# Patient Record
Sex: MALE | Race: WHITE | NOT HISPANIC OR LATINO | Employment: FULL TIME | URBAN - METROPOLITAN AREA
[De-identification: names, ages, dates, MRNs, and addresses within clinical notes are randomized per-mention and may not be internally consistent; named-entity substitution may affect disease eponyms.]

---

## 2017-02-14 ENCOUNTER — OFFICE VISIT (OUTPATIENT)
Dept: GASTROENTEROLOGY | Facility: CLINIC | Age: 62
End: 2017-02-14
Payer: COMMERCIAL

## 2017-02-14 VITALS
DIASTOLIC BLOOD PRESSURE: 80 MMHG | HEART RATE: 82 BPM | BODY MASS INDEX: 32.73 KG/M2 | WEIGHT: 228.63 LBS | HEIGHT: 70 IN | SYSTOLIC BLOOD PRESSURE: 126 MMHG

## 2017-02-14 DIAGNOSIS — Z86.010 HX OF COLONIC POLYPS: Primary | ICD-10-CM

## 2017-02-14 PROCEDURE — 99999 PR PBB SHADOW E&M-EST. PATIENT-LVL III: CPT | Mod: PBBFAC,,, | Performed by: INTERNAL MEDICINE

## 2017-02-14 PROCEDURE — 99203 OFFICE O/P NEW LOW 30 MIN: CPT | Mod: S$GLB,,, | Performed by: INTERNAL MEDICINE

## 2017-02-14 RX ORDER — SODIUM, POTASSIUM,MAG SULFATES 17.5-3.13G
SOLUTION, RECONSTITUTED, ORAL ORAL
Qty: 354 ML | Refills: 0 | Status: SHIPPED | OUTPATIENT
Start: 2017-02-14 | End: 2023-05-27

## 2017-02-14 NOTE — MR AVS SNAPSHOT
[facility]  [address]  [place] [address]  Phone: [phone]  Fax: [phone]                  Gavin Webster   2017 8:00 AM   Office Visit    Description:  Male : 1955   Provider:  Elvis Baker III, MD   Department:  [facility]           Reason for Visit     Colon Cancer Screening           Diagnoses this Visit        Comments    Hx of colonic polyps    -  Primary            To Do List           Goals (5 Years of Data)     None       These Medications        Disp Refills Start End    sodium,potassium,mag sulfates (SUPREP BOWEL PREP KIT) 17.5-3.13-1.6 gram SolR 354 mL 0 2017     As Directed    Pharmacy: [facility]/pharmacy #[id number] - [place] - [address] AT [facility] Ph #: [phone]         [facility] On Call     [facility] On Call Nurse Care Line -  Assistance  Registered nurses in the [facility] provide clinical advisement, health education, appointment booking, and other advisory services.  Call for this free service at [phone].             Medications           Message regarding Medications     Verify the changes and/or additions to your medication regime listed below are the same as discussed with your clinician today.  If any of these changes or additions are incorrect, please notify your healthcare provider.        START taking these NEW medications        Refills    sodium,potassium,mag sulfates (SUPREP BOWEL PREP KIT) 17.5-3.13-1.6 gram SolR 0    Sig: As Directed    Class: Normal           Verify that the below list of medications is an accurate representation of the medications you are currently taking.  If none reported, the list may be blank. If incorrect, please contact your healthcare provider. Carry this list with you in case of emergency.           Current Medications     doxazosin (CARDURA) 8 MG Tab TAKE 1 TABLET BY MOUTH AT BEDTIME    levothyroxine  "(SYNTHROID) 137 MCG Tab tablet Take 1 tablet (137 mcg total) by mouth once daily.    simvastatin (ZOCOR) 40 MG tablet Take 1 tablet (40 mg total) by mouth every evening.    meloxicam (MOBIC) 15 MG tablet Take 1 tablet (15 mg total) by mouth daily as needed for Pain.    sodium,potassium,mag sulfates (SUPREP BOWEL PREP KIT) 17.5-3.13-1.6 gram SolR As Directed           Clinical Reference Information           Your Vitals Were     BP Pulse Height Weight BMI    126/80 82 5' 10" (1.778 m) 103.7 kg (228 lb 9.9 oz) 32.8 kg/m2      Blood Pressure          Most Recent Value    BP  126/80      Allergies as of 2/14/2017     No Known Allergies      Immunizations Administered on Date of Encounter - 2/14/2017     None      Orders Placed During Today's Visit      Normal Orders This Visit    Case request GI: COLONOSCOPY       [facility] Sign-Up     Activating your [facility] account is as easy as 1-2-3!     1) Visit [email or web address], select Sign Up Now, enter this activation code and your date of birth, then select Next.  [email or web address]  Expires: 3/24/2017  2:12 PM      2) Create a username and password to use when you visit [facility] in the future and select a security question in case you lose your password and select Next.    3) Enter your e-mail address and click Sign Up!    Additional Information  If you have questions, please e-mail [email or web address] or call [phone] to talk to our [facility] staff. Remember, [facility] is NOT to be used for urgent needs. For medical emergencies, dial 911.         Language Assistance Services     ATTENTION: Language assistance services are available, free of charge. Please call [phone].      ATENCIÓN: Si habla patt, tiene a angeles disposición servicios gratuitos de asistencia lingüística. Llame al [phone].     CHÚ Ý: N?u b?n nói Ti?ng Vi?t, có các d?ch v? h? tr? ngôn ng? mi?n phí dành cho b?n. G?i s? [phone].         [facility] complies with applicable " Federal civil rights laws and does not discriminate on the basis of race, color, national origin, age, disability, or sex.

## 2017-02-14 NOTE — PROGRESS NOTES
Subjective:       Patient ID: Gavin Webster is a 61 y.o. male.    Chief Complaint: Colon Cancer Screening    HPI Comments: Patient's last colonoscopy in 2014, and a Large Descending colon polyp was removed. The lesion had high grade dysplasia so he should have been back in 1 year. He has not kept his appointment before but is here now for his repeat evaluation. He denies abdominal pain, BRBPR, anorexia or weight loss.     Review of Systems   Constitutional: Negative.  Negative for activity change, appetite change, chills, diaphoresis, fatigue, fever and unexpected weight change.   HENT: Negative for congestion, ear discharge, facial swelling, hearing loss, nosebleeds, postnasal drip, sinus pressure, sneezing, tinnitus, trouble swallowing and voice change.    Eyes: Negative for photophobia, redness and visual disturbance.   Respiratory: Negative for cough, chest tightness, shortness of breath and wheezing.    Cardiovascular: Negative for chest pain and palpitations.   Gastrointestinal: Negative for abdominal distention, abdominal pain, blood in stool, constipation, diarrhea, nausea, rectal pain and vomiting.   Genitourinary: Negative for difficulty urinating, discharge, dysuria, flank pain, frequency, hematuria, scrotal swelling, testicular pain and urgency.   Musculoskeletal: Negative for arthralgias, back pain, gait problem, joint swelling, myalgias and neck stiffness.   Skin: Negative for color change, pallor, rash and wound.   Neurological: Negative for dizziness, tremors, seizures, syncope, facial asymmetry, speech difficulty, weakness, light-headedness, numbness and headaches.   Hematological: Negative for adenopathy. Does not bruise/bleed easily.   Psychiatric/Behavioral: Negative for agitation, confusion, hallucinations, sleep disturbance and suicidal ideas.       Objective:      Physical Exam   Constitutional: He is oriented to person, place, and time. He appears well-developed and well-nourished. No  distress.   HENT:   Head: Normocephalic and atraumatic.   Nose: Nose normal.   Mouth/Throat: Oropharynx is clear and moist. No oropharyngeal exudate.   Eyes: Conjunctivae are normal. Pupils are equal, round, and reactive to light. No scleral icterus.   Neck: Normal range of motion. Neck supple. No thyromegaly present.   Carotid pulses not heard well.   Cardiovascular: Normal rate and regular rhythm.  Exam reveals no gallop and no friction rub.    No murmur heard.  Pulmonary/Chest: Effort normal and breath sounds normal. No respiratory distress. He has no wheezes. He has no rales.   Abdominal: Soft. Bowel sounds are normal. He exhibits no distension and no mass. There is no tenderness. There is no rebound and no guarding.   Musculoskeletal: He exhibits no edema or tenderness.   Lymphadenopathy:     He has no cervical adenopathy.   Neurological: He is alert and oriented to person, place, and time. He exhibits normal muscle tone. Coordination normal.   Skin: Skin is warm. No rash noted. He is not diaphoretic.   Psychiatric: He has a normal mood and affect. His behavior is normal. Judgment and thought content normal.   Vitals reviewed.      Assessment:     Hx of Colon Polyp with high grade dysplasia  No diagnosis found.    Plan:       Colonoscopy

## 2018-05-10 ENCOUNTER — OFFICE VISIT (OUTPATIENT)
Dept: GASTROENTEROLOGY | Facility: CLINIC | Age: 63
End: 2018-05-10
Payer: COMMERCIAL

## 2018-05-10 VITALS
SYSTOLIC BLOOD PRESSURE: 122 MMHG | HEART RATE: 84 BPM | WEIGHT: 231.94 LBS | DIASTOLIC BLOOD PRESSURE: 80 MMHG | HEIGHT: 70 IN | BODY MASS INDEX: 33.21 KG/M2

## 2018-05-10 DIAGNOSIS — Z86.010 HX OF COLONIC POLYPS: Primary | ICD-10-CM

## 2018-05-10 PROCEDURE — 99499 UNLISTED E&M SERVICE: CPT | Mod: S$GLB,,, | Performed by: INTERNAL MEDICINE

## 2018-05-10 PROCEDURE — 99999 PR PBB SHADOW E&M-EST. PATIENT-LVL II: CPT | Mod: PBBFAC,,, | Performed by: INTERNAL MEDICINE

## 2018-05-10 NOTE — PROGRESS NOTES
Subjective:       Patient ID: Gavin Webster is a 62 y.o. male.    Chief Complaint: Colon Cancer Screening    Patient seen previously for colon cancer screen setup. Problem occurred with his insurance and this has been straightened out. He does not need a visit; only orders. Will arrange.      Review of Systems    Objective:      Physical Exam    Assessment:    Colon Cancer Screen  No diagnosis found.    Plan:       Colonoscopy

## 2018-06-08 ENCOUNTER — ANESTHESIA (OUTPATIENT)
Dept: ENDOSCOPY | Facility: HOSPITAL | Age: 63
End: 2018-06-08
Payer: COMMERCIAL

## 2018-06-08 ENCOUNTER — HOSPITAL ENCOUNTER (OUTPATIENT)
Facility: HOSPITAL | Age: 63
Discharge: HOME | End: 2018-06-08
Attending: INTERNAL MEDICINE | Admitting: INTERNAL MEDICINE
Payer: COMMERCIAL

## 2018-06-08 ENCOUNTER — ANESTHESIA EVENT (OUTPATIENT)
Dept: ENDOSCOPY | Facility: HOSPITAL | Age: 63
End: 2018-06-08
Payer: COMMERCIAL

## 2018-06-08 ENCOUNTER — SURGERY (OUTPATIENT)
Age: 63
End: 2018-06-08

## 2018-06-08 VITALS
HEART RATE: 48 BPM | TEMPERATURE: 98 F | WEIGHT: 225 LBS | RESPIRATION RATE: 18 BRPM | BODY MASS INDEX: 32.21 KG/M2 | DIASTOLIC BLOOD PRESSURE: 89 MMHG | OXYGEN SATURATION: 97 % | HEIGHT: 70 IN | SYSTOLIC BLOOD PRESSURE: 157 MMHG

## 2018-06-08 DIAGNOSIS — Z86.010 HX OF ADENOMATOUS COLONIC POLYPS: ICD-10-CM

## 2018-06-08 DIAGNOSIS — K57.30 DIVERTICULOSIS OF LARGE INTESTINE WITHOUT HEMORRHAGE: Primary | ICD-10-CM

## 2018-06-08 DIAGNOSIS — Z80.0 FH: COLON CANCER IN RELATIVE DIAGNOSED AT >50 YEARS OLD: ICD-10-CM

## 2018-06-08 PROBLEM — Z86.0101 HX OF ADENOMATOUS COLONIC POLYPS: Status: ACTIVE | Noted: 2018-06-08

## 2018-06-08 PROCEDURE — 25000003 PHARM REV CODE 250: Performed by: NURSE ANESTHETIST, CERTIFIED REGISTERED

## 2018-06-08 PROCEDURE — 37000009 HC ANESTHESIA EA ADD 15 MINS: Performed by: INTERNAL MEDICINE

## 2018-06-08 PROCEDURE — 63600175 PHARM REV CODE 636 W HCPCS: Performed by: NURSE ANESTHETIST, CERTIFIED REGISTERED

## 2018-06-08 PROCEDURE — 37000008 HC ANESTHESIA 1ST 15 MINUTES: Performed by: INTERNAL MEDICINE

## 2018-06-08 PROCEDURE — G0105 COLORECTAL SCRN; HI RISK IND: HCPCS | Performed by: INTERNAL MEDICINE

## 2018-06-08 PROCEDURE — G0105 COLORECTAL SCRN; HI RISK IND: HCPCS | Mod: ,,, | Performed by: INTERNAL MEDICINE

## 2018-06-08 PROCEDURE — 25000003 PHARM REV CODE 250: Performed by: INTERNAL MEDICINE

## 2018-06-08 RX ORDER — SODIUM CHLORIDE, SODIUM LACTATE, POTASSIUM CHLORIDE, CALCIUM CHLORIDE 600; 310; 30; 20 MG/100ML; MG/100ML; MG/100ML; MG/100ML
INJECTION, SOLUTION INTRAVENOUS CONTINUOUS
Status: DISCONTINUED | OUTPATIENT
Start: 2018-06-08 | End: 2018-06-08 | Stop reason: HOSPADM

## 2018-06-08 RX ORDER — LIDOCAINE HYDROCHLORIDE 10 MG/ML
1 INJECTION, SOLUTION EPIDURAL; INFILTRATION; INTRACAUDAL; PERINEURAL ONCE
Status: CANCELLED | OUTPATIENT
Start: 2018-06-08 | End: 2018-06-08

## 2018-06-08 RX ORDER — PROPOFOL 10 MG/ML
VIAL (ML) INTRAVENOUS
Status: DISCONTINUED | OUTPATIENT
Start: 2018-06-08 | End: 2018-06-08

## 2018-06-08 RX ORDER — SODIUM CHLORIDE, SODIUM LACTATE, POTASSIUM CHLORIDE, CALCIUM CHLORIDE 600; 310; 30; 20 MG/100ML; MG/100ML; MG/100ML; MG/100ML
INJECTION, SOLUTION INTRAVENOUS CONTINUOUS
Status: CANCELLED | OUTPATIENT
Start: 2018-06-08

## 2018-06-08 RX ORDER — LIDOCAINE HYDROCHLORIDE 10 MG/ML
INJECTION INFILTRATION; PERINEURAL
Status: DISCONTINUED | OUTPATIENT
Start: 2018-06-08 | End: 2018-06-08

## 2018-06-08 RX ADMIN — LIDOCAINE HYDROCHLORIDE 50 MG: 10 INJECTION, SOLUTION INFILTRATION; PERINEURAL at 09:06

## 2018-06-08 RX ADMIN — PROPOFOL 20 MG: 10 INJECTION, EMULSION INTRAVENOUS at 09:06

## 2018-06-08 RX ADMIN — PROPOFOL 90 MG: 10 INJECTION, EMULSION INTRAVENOUS at 09:06

## 2018-06-08 RX ADMIN — SODIUM CHLORIDE, SODIUM LACTATE, POTASSIUM CHLORIDE, AND CALCIUM CHLORIDE: .6; .31; .03; .02 INJECTION, SOLUTION INTRAVENOUS at 08:06

## 2018-06-08 NOTE — DISCHARGE SUMMARY
[facility]  Brief Operative Note     SUMMARY     Surgery Date: 6/8/2018     Surgeon(s) and Role:     * Elvis Baker III, MD - Primary    Assisting Surgeon: None    Pre-op Diagnosis:  Hx of colonic polyps [Z86.010]    Post-op Diagnosis:  Post-Op Diagnosis Codes:     * Hx of colonic polyps [Z86.010]      - FH Colon Cancer      - Diverticulosis  Procedure(s) (LRB):  COLONOSCOPY okay by Samuel (N/A)    Anesthesia: Monitor Anesthesia Care    Description of the findings of the procedure: Procedures completed. See Procedure note for full details.    Findings/Key Components: Procedures completed. See Procedure note for full details.    Prosthetics/Devices: None    Estimated Blood Loss: * No values recorded between 6/8/2018 12:00 AM and 6/8/2018 10:04 AM *         Specimens:   Specimen (12h ago through future)    None          Discharge Note    SUMMARY     Admit Date: 6/8/2018    Discharge Date and Time: 6/8/2018    Hospital Course (synopsis of major diagnoses, care, treatment, and services provided during the course of the hospital stay):  Procedures completed. See Procedure note for full details. Discharge patient when discharge criteria met.    Final Diagnosis: Post-Op Diagnosis Codes:     * Hx of colonic polyps [Z86.010]      - FH colon Cancer      - Diverticulosis  Disposition: Discharge patient when discharge criteria met.    Follow Up/Patient Instructions:       Medications:  Reconciled Home Medications: Current Discharge Medication List      CONTINUE these medications which have NOT CHANGED    Details   doxazosin (CARDURA) 8 MG Tab TAKE 1 TABLET BY MOUTH AT BEDTIME  Qty: 90 tablet, Refills: 0      levothyroxine (SYNTHROID) 137 MCG Tab tablet Take 1 tablet (137 mcg total) by mouth once daily.  Qty: 90 tablet, Refills: 3    Associated Diagnoses: Thyroid cancer      simvastatin (ZOCOR) 40 MG tablet Take 1 tablet (40 mg total) by mouth every evening.  Qty: 90 tablet, Refills: 3    Associated  Diagnoses: Other and unspecified hyperlipidemia      sodium,potassium,mag sulfates (SUPREP BOWEL PREP KIT) 17.5-3.13-1.6 gram SolR As Directed  Qty: 354 mL, Refills: 0    Associated Diagnoses: Hx of colonic polyps      meloxicam (MOBIC) 15 MG tablet Take 1 tablet (15 mg total) by mouth daily as needed for Pain.  Qty: 30 tablet, Refills: 1    Associated Diagnoses: Rotator cuff tendonitis, right              Discharge Procedure Orders  Diet general     Activity as tolerated

## 2018-06-08 NOTE — INTERVAL H&P NOTE
The patient has been examined and the H&P has been reviewed:I have reviewed this note and I agree with this assessment. The patient was seen in the GI office and remains stable for endoscopy at the time of this present evaluation.         Anesthesia/Surgery risks, benefits and alternative options discussed and understood by patient/family.          Active Hospital Problems    Diagnosis  POA    Hx of adenomatous colonic polyps [Z86.010]  Not Applicable      Resolved Hospital Problems    Diagnosis Date Resolved POA   No resolved problems to display.

## 2018-06-08 NOTE — DISCHARGE INSTRUCTIONS

## 2018-06-08 NOTE — H&P
Short Stay Endoscopy History and Physical    PCP - Primary Doctor No    Procedure - Colonoscopy  ASA - 2  Mallampati - per anesthesia  History of Anesthesia problems - no  Family history Anesthesia problems -  no     HPI:  This is a 62 y.o.male here for evaluation of : Hx of Colon Polyps    Reflux - no  Dysphagia - no  Abdominal pain - no  Diarrhea - no  Anemia - no  GI bleeding - no  Nausea and vomiting-no  Early satiety-no  aversion to sight or smell of food-no    ROS:  Constitutional: No fevers, chills, No weight loss  ENT: No allergies  CV: No chest pain  Pulm: No cough, No shortness of breath  Ophtho: No vision changes  GI: see HPI  Derm: No rash  Heme: No lymphadenopathy, No bruising  MSK: No arthritis  : No dysuria, No hematuria  Endo: No hot or cold intolerance  Neuro: No syncope, No seizure  Psych: No anxiety, No depression    Medical History:  Past Medical History:   Diagnosis Date    Hyperlipidemia     Thyroid disease        Surgical History:  Past Surgical History:   Procedure Laterality Date    THYROIDECTOMY  2008       Family History:  Family History   Problem Relation Age of Onset    Cancer Mother     Cancer Father        Social History:  Social History     Social History    Marital status:      Spouse name: Meredith    Number of children: N/A    Years of education: N/A     Occupational History    Not on file.     Social History Main Topics    Smoking status: Never Smoker    Smokeless tobacco: Never Used    Alcohol use 0.0 oz/week    Drug use: No    Sexual activity: Yes     Partners: Female     Other Topics Concern    Not on file     Social History Narrative    No narrative on file       Allergies: Review of patient's allergies indicates:  No Known Allergies    Medications:   No current facility-administered medications on file prior to encounter.      Current Outpatient Prescriptions on File Prior to Encounter   Medication Sig Dispense Refill    doxazosin (CARDURA) 8 MG Tab  TAKE 1 TABLET BY MOUTH AT BEDTIME 90 tablet 0    levothyroxine (SYNTHROID) 137 MCG Tab tablet Take 1 tablet (137 mcg total) by mouth once daily. 90 tablet 3    meloxicam (MOBIC) 15 MG tablet Take 1 tablet (15 mg total) by mouth daily as needed for Pain. 30 tablet 1    simvastatin (ZOCOR) 40 MG tablet Take 1 tablet (40 mg total) by mouth every evening. 90 tablet 3    sodium,potassium,mag sulfates (SUPREP BOWEL PREP KIT) 17.5-3.13-1.6 gram SolR As Directed 354 mL 0       Objective Findings:    Vital Signs:There were no vitals filed for this visit.        Physical Exam:  General Appearance: Well appearing in no acute distress  Eyes:    No scleral icterus  ENT: Neck supple, Lips, mucosa, and tongue normal; teeth and gums normal  Lungs: CTA bilaterally in anterior and posterior fields, no wheezes, no crackles.  Heart:  Regular rate, S1, S2 normal, no murmurs heard.  Abdomen: Soft, non tender, non distended with normal bowel sounds. No hepatosplenomegaly, ascites, or mass.  Extremities: No clubbing, cyanosis or edema  Skin: No rash    Labs:  Reviewed  Colonoscopy  I have explained the risks and benefits of endoscopy procedures to the patient including but not limited to bleeding, perforation, infection, and death. The patient wishes to proceed.

## 2018-06-08 NOTE — ANESTHESIA POSTPROCEDURE EVALUATION
"Anesthesia Post Evaluation    Patient: Gavin Webster    Procedure(s) Performed: Procedure(s) (LRB):  COLONOSCOPY okay by Samuel (N/A)    Final Anesthesia Type: MAC  Patient location during evaluation: PACU  Patient participation: Yes- Able to Participate  Level of consciousness: awake  Post-procedure vital signs: reviewed and stable  Pain management: adequate  Airway patency: patent  PONV status at discharge: No PONV  Anesthetic complications: no      Cardiovascular status: blood pressure returned to baseline and hemodynamically stable  Respiratory status: unassisted, spontaneous ventilation and room air  Hydration status: euvolemic  Follow-up not needed.        Visit Vitals  BP (!) 144/89 (BP Location: Left arm, Patient Position: Lying)   Pulse (!) 48   Temp 36.6 °C (97.8 °F) (Oral)   Resp 18   Ht 5' 10" (1.778 m)   Wt 102.1 kg (225 lb)   SpO2 96%   BMI 32.28 kg/m²       Pain/Gino Score: Pain Assessment Performed: Yes (6/8/2018  9:52 AM)  Presence of Pain: denies (6/8/2018  9:52 AM)  Gino Score: 9 (6/8/2018  9:52 AM)      "

## 2018-06-08 NOTE — TRANSFER OF CARE
"Anesthesia Transfer of Care Note    Patient: Gavin Webster    Procedure(s) Performed: Procedure(s) (LRB):  COLONOSCOPY okay by Samuel (N/A)    Patient location: PACU    Anesthesia Type: MAC    Transport from OR: Transported from OR on room air with adequate spontaneous ventilation    Post pain: adequate analgesia    Post assessment: no apparent anesthetic complications and tolerated procedure well    Post vital signs: stable    Level of consciousness: awake    Nausea/Vomiting: no nausea/vomiting    Complications: none    Transfer of care protocol was followed      Last vitals:   Visit Vitals  BP (!) 184/86 (BP Location: Left arm)   Pulse (!) 56   Temp 36.6 °C (97.8 °F) (Oral)   Resp 18   Ht 5' 10" (1.778 m)   Wt 102.1 kg (225 lb)   SpO2 96%   BMI 32.28 kg/m²     "

## 2018-06-08 NOTE — ANESTHESIA PREPROCEDURE EVALUATION
06/08/2018  Gavin Webster is a 62 y.o., male.    Anesthesia Evaluation    I have reviewed the Patient Summary Reports.    I have reviewed the Nursing Notes.   I have reviewed the Medications.     Review of Systems  Anesthesia Hx:  No problems with previous Anesthesia  Denies Family Hx of Anesthesia complications.   Denies Personal Hx of Anesthesia complications.   Social:  Social Alcohol Use, Non-Smoker    Hematology/Oncology:  Hematology Normal      Oncology Comments: Thyroid cancer    Cardiovascular:   Denies Hypertension.  Denies MI.   Denies CABG/stent.      hyperlipidemia    Pulmonary:   Denies COPD.  Denies Asthma. Sleep Apnea, CPAP    Renal/:   BPH    Hepatic/GI:   Bowel Prep. Denies GERD. Denies Liver Disease.  Denies Hepatitis.    Musculoskeletal:  Musculoskeletal Normal    Neurological:   Denies CVA. Denies Seizures.    Endocrine:   Denies Diabetes. Hypothyroidism        Physical Exam  General:  Obesity    Airway/Jaw/Neck:  Airway Findings: Mouth Opening: Normal Tongue: Normal  General Airway Assessment: Adult  Mallampati: II      Dental:  Dental Findings: In tact   Chest/Lungs:  Chest/Lungs Findings: Clear to auscultation, Normal Respiratory Rate     Heart/Vascular:  Heart Findings: Rate: Normal  Rhythm: Regular Rhythm  Sounds: Normal             Anesthesia Plan  Type of Anesthesia, risks & benefits discussed:  Anesthesia Type:  MAC  Patient's Preference:   Intra-op Monitoring Plan: standard ASA monitors  Intra-op Monitoring Plan Comments:   Post Op Pain Control Plan:   Post Op Pain Control Plan Comments:   Induction:   IV  Beta Blocker:  Patient is not currently on a Beta-Blocker (No further documentation required).       Informed Consent: Patient understands risks and agrees with Anesthesia plan.  Questions answered. Anesthesia consent signed with patient.  ASA Score: 2     Day of Surgery  Review of History & Physical: I have interviewed and examined the patient. I have reviewed the patient's H&P dated:  There are no significant changes.  H&P update referred to the surgeon.         Ready For Surgery From Anesthesia Perspective.

## 2018-06-08 NOTE — ANESTHESIA RELEASE NOTE
"Anesthesia Release from PACU Note    Patient: Gavin Webster    Procedure(s) Performed: Procedure(s) (LRB):  COLONOSCOPY okay by Samuel (N/A)    Anesthesia type: MAC    Post pain: Adequate analgesia    Post assessment: no apparent anesthetic complications, tolerated procedure well and no evidence of recall    Last Vitals:   Visit Vitals  BP (!) 184/86 (BP Location: Left arm)   Pulse (!) 56   Temp 36.6 °C (97.8 °F) (Oral)   Resp 18   Ht 5' 10" (1.778 m)   Wt 102.1 kg (225 lb)   SpO2 96%   BMI 32.28 kg/m²       Post vital signs: stable    Level of consciousness: awake    Nausea/Vomiting: no nausea/no vomiting    Complications: none    Airway Patency: patent    Respiratory: unassisted, spontaneous ventilation, room air    Cardiovascular: stable and blood pressure at baseline    Hydration: euvolemic  "

## 2018-06-08 NOTE — [employer] PATIENT INSTRUCTIONS
Discharge Summary/Instructions after an Endoscopic Procedure  Patient Name: Gavin Webster  Patient MRN: [id number]  Patient YOB: 1955 Friday, June 08, 2018 Elvis Baker III, MD  RESTRICTIONS:  During your procedure today, you received medications for sedation.  These   medications may affect your judgment, balance and coordination.  Therefore,   for 24 hours, you have the following restrictions:   - DO NOT drive a car, operate machinery, make legal/financial decisions,   sign important papers or drink alcohol.    ACTIVITY:  The following day: return to full activity including work, except no heavy   lifting, straining or running for 3 days if polyps were removed.  DIET:  Eat and drink normally unless instructed otherwise.     TREATMENT FOR COMMON SIDE EFFECTS:  - Mild abdominal pain, nausea, belching, bloating or excessive gas:  rest,   eat lightly and use a heating pad.  - Sore Throat: treat with throat lozenges and/or gargle with warm salt   water.  - Because air was used during the procedure, expelling large amounts of air   from your rectum or belching is normal.  - If a bowel prep was taken, you may not have a bowel movement for 1-3 days.    This is normal.  SYMPTOMS TO WATCH FOR AND REPORT TO YOUR PHYSICIAN:  1. Abdominal pain or bloating, other than gas cramps.  2. Chest pain.  3. Back pain.  4. Signs of infection such as: chills or fever occurring within 24 hours   after the procedure.  5. Rectal bleeding, which would show as bright red, maroon, or black stools.   (A tablespoon of blood from the rectum is not serious, especially if   hemorrhoids are present.)  6. Vomiting.  7. Weakness or dizziness.  GO DIRECTLY TO THE NEAREST EMERGENCY ROOM IF YOU HAVE ANY OF THE FOLLOWING:      Difficulty breathing              Chills and/or fever over 101 F   Persistent vomiting and/or vomiting blood   Severe abdominal pain   Severe chest pain   Black, tarry stools   Bleeding- more than one tablespoon   Any  other symptom or condition that you feel may need urgent attention  Your doctor recommends these additional instructions:  If any biopsies were taken, your doctors clinic will contact you in 1 to 2   weeks with any results.  - Discharge patient to home (via wheelchair).   - High fiber diet.   - Continue present medications.   - Repeat colonoscopy in 5 years for surveillance.   - Repeat colonoscopy in 5 years for screening purposes.   - Return to GI office PRN.   - Discharge patient to home (via wheelchair).   - High fiber diet.   - Continue present medications.   - Repeat colonoscopy in 5 years for surveillance.   - Return to primary care physician as previously scheduled.  For questions, problems or results please call your physician Elvis Baker III, MD at Work:  [phone]  If you have any questions about the above instructions, call the GI   department at [phone] or call the endoscopy unit at [phone]   from 7am until 3 pm.  [facility], EMERGENCY ROOM PHONE NUMBER:   [phone]  IF A COMPLICATION OR EMERGENCY SITUATION ARISES AND YOU ARE UNABLE TO REACH   YOUR PHYSICIAN - GO DIRECTLY TO THE EMERGENCY ROOM.  I have read or have had read to me these discharge instructions for my   procedure and have received a written copy.  I understand these   instructions and will follow-up with my physician if I have any questions.     __________________________________       _____________________________________  Nurse Signature                                          Patient/Designated   Responsible Party Signature  Elvis Baker III, MD  6/8/2018 10:01:06 AM  This report has been verified and signed electronically.  [employer]

## 2018-07-02 ENCOUNTER — TELEPHONE (OUTPATIENT)
Dept: GASTROENTEROLOGY | Facility: CLINIC | Age: 63
End: 2018-07-02

## 2018-07-02 NOTE — TELEPHONE ENCOUNTER
----- Message from Chavo Watkins sent at 7/2/2018  4:50 PM CDT -----  Contact: Nesha Nichols [facility]     Need pts colon.  test results faxed over.       Fax [phone]

## 2019-05-31 ENCOUNTER — CLINICAL SUPPORT (OUTPATIENT)
Dept: AUDIOLOGY | Facility: CLINIC | Age: 64
End: 2019-05-31
Payer: COMMERCIAL

## 2019-05-31 DIAGNOSIS — H90.3 SENSORINEURAL HEARING LOSS, BILATERAL: Primary | ICD-10-CM

## 2019-05-31 DIAGNOSIS — H93.13 TINNITUS, BILATERAL: ICD-10-CM

## 2019-05-31 PROCEDURE — 92557 COMPREHENSIVE HEARING TEST: CPT | Mod: S$GLB,,, | Performed by: AUDIOLOGIST-HEARING AID FITTER

## 2019-05-31 PROCEDURE — 92567 TYMPANOMETRY: CPT | Mod: S$GLB,,, | Performed by: AUDIOLOGIST-HEARING AID FITTER

## 2019-05-31 PROCEDURE — 92557 PR COMPREHENSIVE HEARING TEST: ICD-10-PCS | Mod: S$GLB,,, | Performed by: AUDIOLOGIST-HEARING AID FITTER

## 2019-05-31 PROCEDURE — 92567 PR TYMPA2METRY: ICD-10-PCS | Mod: S$GLB,,, | Performed by: AUDIOLOGIST-HEARING AID FITTER
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
## 2020-06-03 NOTE — PROGRESS NOTES
Referring Provider:    Lucareferral Self  No address on file  Subjective:   Patient: Gavin Webster [id number], :1955   Visit date:2020 5:49 PM    Chief Complaint:  Other (dizziness onset by sinus pressure for the last 15years.)    HPI:  Gavin is a 64 y.o. male who I was asked to see in consultation for evaluation of the following issue(s):    Dizziness/Vertigo:  Onset:  Several years  Total number of episodes:  TNTC  Duration of individual episodes: minutes  Severity: severe  Exacerbating factors: any motion  Prior Medications: nothing  Relieving factors:  remaining motionless  Associated signs and symptoms:  Frontal pressure  Quality:   Spinning- Yes   Light headedness- No   Sensation that room is moving but patient is motionless- No  Prior history of similar events: No    Hearing Loss:    He describes a equally, bilateral sided hearing loss starting many years ago and has been gradually worsening.      The patient reports the following risk factors for hearing loss (Negative unless checked off):   [] Familial deafness   [] Ototoxic medication exposure  [] Acoustic trauma  [] Head injury or trauma  [] Otologic infection  [] History of meningitis  [] Ear surgery (other than pediatric tympanostomy tubes)  [] Metabolic disease    Was told many years ago he had Meniere's and was on injections for this w/o relief.       MRI  with maxillary sinusitis.  Was told recently by DMD that CT showed sinus disease.  No specific sinonasal complaints.          Review of Systems:  Negative unless checked off.  Gen:  []fever   []fatigue  HENT:  []nosebleeds  []dental problem   Eyes:  []photophobia  []visual disturbance  Resp:  []chest tightness []wheezing  Card:  []chest pain  []leg swelling  GI:  []abdominal pain []blood in stool  :  []dysuria  []hematuria  Musc:  []joint swelling  []gait problem  Skin:  []color change  []pallor  Neuro:  []seizures  []numbness  Hem:  []bruise/bleed easily  Psych:  " []hallucinations  []behavioral problems  Allergy/Imm: has No Known Allergies.    His meds, allergies, medical, surgical, social & family histories were reviewed & updated:  -     He has a current medication list which includes the following prescription(s): amlodipine, doxazosin, levothyroxine, pravastatin, meloxicam, simvastatin, and sodium,potassium,mag sulfates.  -     He  has a past medical history of BPH (benign prostatic hyperplasia), Hyperlipidemia, and Thyroid disease.   -     He does not have any pertinent problems on file.   -     He  has a past surgical history that includes Thyroidectomy (2008) and Colonoscopy (N/A, 6/8/2018).  -     He  reports that he has never smoked. He has never used smokeless tobacco. He reports that he drinks alcohol. He reports that he does not use drugs.  -     His family history includes Cancer in his father and mother.  -     He has No Known Allergies.    Objective:     Physical Exam: (abnormal findings indicated in BOLD)    Physical Exam:  Vitals:  BP (!) 141/79   Pulse 71   Temp 97.1 °F (36.2 °C) (Tympanic)   Ht 5' 10" (1.778 m)   Wt 109.1 kg (240 lb 8.4 oz)   BMI 34.51 kg/m²   General appearance:  Well developed, well nourished    Eyes:  Extraocular motions intact, PERRL    Communication:  no hoarseness, no dysphonia    Ears:  Otoscopy of external auditory canals and tympanic membranes was normal, clinical speech reception thresholds grossly intact, no mass/lesion of auricle.  Nose:  No masses/lesions of external nose, nasal mucosa, septum, and turbinates were within normal limits.  Mouth:  No mass/lesion of lips, teeth, gums, hard/soft palate, tongue, tonsils, or oropharynx.    Cardiovascular:  No pedal edema; Radial Pulses +2     Neck & Lymphatics:  No cervical lymphadenopathy, no neck mass/crepitus/ asymmetry, trachea is midline, no thyroid enlargement/tenderness/mass.    Psych: Oriented x3,  Alert with normal mood and affect.     Respiration/Chest:  Symmetric " expansion during respiration, normal respiratory effort.    Skin:  Warm and intact. No ulcerations of face, scalp, neck.    CRANIAL NERVES:  III, IV, VI:  Extraocular muscles intact.  Pupils equally reactive to light and accommodation.  V: Facial sensory function to light touch intact and symmetrical.  No evidence of atrophy of the masseter and temporal muscles.  VII:  Facial strength intact and symmetrical.  IX:  Soft palate elevates in the midline.  XI:  Shoulders motility and strength intact and symmetrical. No atrophy of sternocleidomastoid and trapezius muscles.  XII:  Tongue motility and strength intact.  No spontaneous or gaze nystagmus.    [place]-Hallpike test:  No vertigo or nystagmus.    Head-shaking test:  No nystagmus.  Vestibulo-ocular reflex:  No evidence of catch-up saccades to bilateral head turns.  Oculo-counter torsion:  Intact to bilateral head tilts.  Romberg test:  No abnormal sway or falls with eyes open and closed.    Fukuda stepping test:  No abnormal sway.    Gait:  No ataxia and can tandem gait 6 steps.    Finger-to-nose testing intact without dysmetria.  Deviation of index.  Eyes closed:  No deviation.  MOTOR STRENGTH:  Strength 5/5 throughout.  SENSORY:  Sensory function to light touch and proprioception intact throughout.      Assessment & Plan:   Gavin was seen today for other.    Diagnoses and all orders for this visit:    Vertigo    Sensorineural hearing loss (SNHL) of both ears          DIZZINESS-   Dizziness is an extremely complex problem that may arise from many sources.  I requires the coordination between the visual system, the vestibular system as well as the proprioreceptive system.  Additionally, balance is compromised in the setting of musculoskeletal, cerebral, cardiac, and numerous physiologic disorders.  The complex interplay between these systems may also lead to dizziness if there is dysynchrony between the bilateral vestibular symptoms.    Central vestibular symptoms can  generally be distinguished from peripheral vestibulopathies by the presence of other non vestibular neurologic symptoms (focal weakness, headache), light headedness (rather than true vertigo), near syncope, weak limbs, panic, fuzziness/cloudiness in mentation, and clumsiness.  Peripheral vestibulopathies are generally, at some point during their course, characterized by a true vertiginous sensation of movement, difficulty with sudden head movements, nausea, difficulty with sudden head movement, and possibly oscicllopsia.    BPPV is the most common cause of episodic vertigo.  Symptoms generally last for seconds then resolve when motionless, otitic symptoms are absent and may be provoked with sudden head motion.  This may occur spontaneously, but frequently follow head trauma or recent vestibular neuronitis.  Nystagmus is typically geotropic and directed toward the affected ear (hyperactive input to the inferior vestibular nerve via the Singular nerve). Canalith repositioning maneuvers are the method of choice for treating this condition.  Mild imbalance following is common and may last 1-2 weeks.    Vestibular neuronitis and labyrinthitis can be distinguished by the presence of sensorineural hearing loss in labyrinthitis, but during their active phase, vertigo is constant.   MRI may be necessary during this phase to exclude CNS pathology.  Frequently this is preceded by a viral illness. Both result in permanent partial end organ weakness of the affected vestibular nerve (usually superior).  Otherwise, they are essentially the same.  The early (vertiginous, 1-3 days) phase is characterized by acute onset of vertigo that is essentially constant and present even in the absence of motion.  Nystagmus is directed away from the affected ear (hypoactive).  In the acute phase, steroids, limited use of vestibular suppressants and hydration are the most effective treatment. Patients then enter the second phase of uncompensated  vestibulopathy where they have a general sense of imbalance.  The duration of this phase depends on several factors, but is generally delayed in the presence of vestibular suppressants, advanced age, central/systemic balance issues and sessile behavior.   Phase 3 is compensated vestibulopathy where symptoms generally only occur with sudden head movements and can be seen with catch up saccades on head thrust.   However, in the presence of bilateral VN, oscillopsia is the hallmark of the disease and compensation is frequently very delayed and poor.    Other causes of vertigo that are typically constant are vestibulotoxic medications and autoimmune inner ear disease and these are generally bilateral in nature.    Meniere's disease is typically (85%) unilateral and defined by 2 spontaneous vertigo attacks lasting 20 min or more, documented hearing loss (typically low tone, frequently fluctuating) and otic fullness or tinnitus in the affected ear. However, the presence of endolymphatic hydrops may result in similar symptoms, not meeting these strict criteria.  This disease can be difficult to distinguish from vestibular migraines, which are not always associated with headaches.    Superior canal dehiscence presents with episodic vertigo lasting seconds to minutes, aural fullness, autophony, hyperacusis and tinnitus (rehana pulsatile).  Aural pressure is the most common presenting symptom.  Symptoms can be provoked with Valsalva.  Bone conduction thresholds are supranormal.    Perilymph fistula presents with fluctuating hearing loss, episodic vertigo lasting seconds to minutes and frequently is associated with prior barotrauma or otologic surgery.  Conservative measures such as stool softeners and bedrest frequently lead to resolution.  In cases of persistent symptoms, unfortunately there is no noninvasive diagnostic test with high specificity, and surgical exploration is sometimes necessary when this is  expected.    Vestibular schwannomas may have constant or episodic vertigo, frequently SNHL and sometimes may be accompanied by headache or facial numbness.    The diagnosis and options of management were discussed at length with the patient, including hearing, balance function and the risks associated with my recommendations. We spent a considerable amount of time discussing strategies to cope with dizziness. I emphasized the great importance of fall avoidance and activities that may be dangerous if Gavin has an episode of dizziness.  I answered all questions in layman's terms. Based on the history, physical exam and imaging studies there is moderate evidence of a vestibular dysfunction.  I recommend audio, VNG, ECOG.           Thank you for allowing me to participate in the care of Gavin.         Shorty Cadet MD, FACS  [facility]   [facility]  [address]  [place] [address]  P: [phone]  F: [phone]

## 2020-06-04 ENCOUNTER — OFFICE VISIT (OUTPATIENT)
Dept: OTOLARYNGOLOGY | Facility: CLINIC | Age: 65
End: 2020-06-04
Payer: COMMERCIAL

## 2020-06-04 VITALS
WEIGHT: 240.5 LBS | BODY MASS INDEX: 34.43 KG/M2 | TEMPERATURE: 97 F | HEIGHT: 70 IN | SYSTOLIC BLOOD PRESSURE: 141 MMHG | DIASTOLIC BLOOD PRESSURE: 79 MMHG | HEART RATE: 71 BPM

## 2020-06-04 DIAGNOSIS — R42 VERTIGO: Primary | ICD-10-CM

## 2020-06-04 DIAGNOSIS — H90.3 SENSORINEURAL HEARING LOSS (SNHL) OF BOTH EARS: ICD-10-CM

## 2020-06-04 PROCEDURE — 99999 PR PBB SHADOW E&M-EST. PATIENT-LVL III: CPT | Mod: PBBFAC,,, | Performed by: OTOLARYNGOLOGY

## 2020-06-04 PROCEDURE — 3008F PR BODY MASS INDEX (BMI) DOCUMENTED: ICD-10-PCS | Mod: CPTII,S$GLB,, | Performed by: OTOLARYNGOLOGY

## 2020-06-04 PROCEDURE — 99204 OFFICE O/P NEW MOD 45 MIN: CPT | Mod: S$GLB,,, | Performed by: OTOLARYNGOLOGY

## 2020-06-04 PROCEDURE — 3008F BODY MASS INDEX DOCD: CPT | Mod: CPTII,S$GLB,, | Performed by: OTOLARYNGOLOGY

## 2020-06-04 PROCEDURE — 99999 PR PBB SHADOW E&M-EST. PATIENT-LVL III: ICD-10-PCS | Mod: PBBFAC,,, | Performed by: OTOLARYNGOLOGY

## 2020-06-04 PROCEDURE — 99204 PR OFFICE/OUTPT VISIT, NEW, LEVL IV, 45-59 MIN: ICD-10-PCS | Mod: S$GLB,,, | Performed by: OTOLARYNGOLOGY

## 2020-06-04 RX ORDER — AMLODIPINE BESYLATE 5 MG/1
5 TABLET ORAL
COMMUNITY
Start: 2020-01-14 | End: 2020-07-12

## 2020-06-04 RX ORDER — PRAVASTATIN SODIUM 20 MG/1
20 TABLET ORAL
COMMUNITY
Start: 2020-01-29 | End: 2023-05-27

## 2020-06-11 ENCOUNTER — CLINICAL SUPPORT (OUTPATIENT)
Dept: AUDIOLOGY | Facility: CLINIC | Age: 65
End: 2020-06-11
Payer: COMMERCIAL

## 2020-06-11 DIAGNOSIS — H93.13 TINNITUS, BILATERAL: ICD-10-CM

## 2020-06-11 DIAGNOSIS — H81.4 CENTRAL VESTIBULAR VERTIGO: ICD-10-CM

## 2020-06-11 DIAGNOSIS — H90.3 SENSORINEURAL HEARING LOSS, BILATERAL: Primary | ICD-10-CM

## 2020-06-11 DIAGNOSIS — R42 DIZZINESS: Primary | ICD-10-CM

## 2020-06-11 PROCEDURE — 92537 PR CALORIC VSTBLR TEST W/REC BITHERMAL: ICD-10-PCS | Mod: S$GLB,,, | Performed by: AUDIOLOGIST-HEARING AID FITTER

## 2020-06-11 PROCEDURE — 92567 TYMPANOMETRY: CPT | Mod: S$GLB,,, | Performed by: AUDIOLOGIST-HEARING AID FITTER

## 2020-06-11 PROCEDURE — 92585 PR AUDITORY EVOKED POTENTIAL: ICD-10-PCS | Mod: S$GLB,,, | Performed by: AUDIOLOGIST-HEARING AID FITTER

## 2020-06-11 PROCEDURE — 92557 COMPREHENSIVE HEARING TEST: CPT | Mod: S$GLB,,, | Performed by: AUDIOLOGIST-HEARING AID FITTER

## 2020-06-11 PROCEDURE — 92557 PR COMPREHENSIVE HEARING TEST: ICD-10-PCS | Mod: S$GLB,,, | Performed by: AUDIOLOGIST-HEARING AID FITTER

## 2020-06-11 PROCEDURE — 92540 PR VESTIBULAR EVAL NYSTAG FOVL&PERPH STIM OSCIL TRACKING: ICD-10-PCS | Mod: S$GLB,,, | Performed by: AUDIOLOGIST-HEARING AID FITTER

## 2020-06-11 PROCEDURE — 92537 CALORIC VSTBLR TEST W/REC: CPT | Mod: S$GLB,,, | Performed by: AUDIOLOGIST-HEARING AID FITTER

## 2020-06-11 PROCEDURE — 92567 PR TYMPA2METRY: ICD-10-PCS | Mod: S$GLB,,, | Performed by: AUDIOLOGIST-HEARING AID FITTER

## 2020-06-11 PROCEDURE — 92585 PR AUDITORY EVOKED POTENTIAL: CPT | Mod: S$GLB,,, | Performed by: AUDIOLOGIST-HEARING AID FITTER

## 2020-06-11 PROCEDURE — 92540 BASIC VESTIBULAR EVALUATION: CPT | Mod: S$GLB,,, | Performed by: AUDIOLOGIST-HEARING AID FITTER

## 2020-06-11 NOTE — PROGRESS NOTES
Referring physician: Dr. Helio Alonso ROX Webster was seen 06/11/2020 for Electrocochleography (ECoghG) testing.    ECochG test results were obtained utilizing insert TIPTrodes with a click stimulus rate of 11.3/sec presented at 95 dBnHL.  Patient was advised prior to testing that test may be inconclusive with no results obtained due the severity of his hearing loss.  He would like to continue to try testing in the event responses are obtained.     The average SP/AP ratio could not be deteremined due to no identifiable waveforms. .  The average SP/AP ratio could not be determined due to no identifiable waveforms.     Recommend: ENT f/u     Tracings are scanned into computer.

## 2020-06-11 NOTE — Clinical Note
VNG: central indicators vs age-related. Jerk nystagmus wasn't reported in 2010 VNG (not present vs not identified?) Had normal MRI 2010. EcoG: no response as expected with hearing loss (tried since ordered)ABR: normal.  Interesting abt poorer response to condensation but of what clinical significance? Tested abr since central vng. Audio: no significant change over past yearPlease f/u with patient your plan.

## 2020-06-11 NOTE — PROGRESS NOTES
"Referring provider: Dr. Helio Alonso ORX Webster was seen 06/11/2020 for an audiological and vestibular evaluation.  Patient complains of episodic vertigo with emesis that started in 2009.  He describes episodes of room spinning for two-to-four minutes.  No provoking factors; dizziness may occur with or without movement, while driving or while scrolling through cell phone (reading on computer screen).  He gets pressure around his forehead at times.  He feels like he needs to "pop both ears" but is unable to.  He has known hearing loss; last audiogram was 05/2019 and prior 2010.  He has long history of bilateral tinnitus that is unchanged. He had normal VNG in 2010 (at [facility]).  Had normal MRI brain in 2010.  He says he was diagnosed with Meniere's in 2010 by another provider, and advised to avoid bananas and peanut butter.  No history of migraine. No history of motion-intolerance.     Audiology Report:  Results reveal bilateral symmetric normal hearing 125-1000 Hz steeply sloping to a mild-to-severe sensorineural hearing loss 6174-1992 Hz.  Speech Reception Thresholds were 20 dBHL for the right ear and 25 dBHL for the left ear.   Word recognition scores were excellent for the right ear and excellent for the left ear.   Tympanograms were Type A for the right ear and Type A for the left ear.    Electrocochleogram was attempted but could not be assessed due to severity of SNHL.  No identifiable waveforms.     Auditory Brainstem Response (ABR):    ABR test results were obtained utilizing insert phones with a click stimulus rate of 7.7, 27.7 and 77.7/sec presented at 95dBnHL  Right and left ear test results indicated the presence of waveforms I, III, and V. Waveform morphology was excellent.  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  Normal absolute and interpeak waveform latencies were identified bilaterally, with exception of delayed wave III at 27.7/second for the right ear. No " "significant interaural waveform differences were noted between the right and left ear waveform latencies. Increasing the click stimulus rate from 7.7/sec to 77.7/sec did not significantly deteriorate the waveform V morphology or delay the waveform latency.     Summary: Normal ABR.  Of note, waveform morphology to condensation click presentations were fair but still able to identify waves I, III and V.  Waveform morphology to rarefaction clicks were excellent.     Videonystagmography Report (VNG):  Oculomotor function tests:  1. Sinusoidal tracking was abnormal.  Ocular flutter and square-wave nystagmus was present for both rightward and leftward targets at all test frequencies.  Gain was normal at all test frequencies.   2. Saccade revealed normal latencies, accuracies and velocities.   3. Optokinetic test was normal and symmetric.  Gaze test was absent for nystagmus.  Spontaneous test revealed square-wave nystagmus with and without fixation.     Head-shake test revealed square wave nystagmus mixed with 3 d/s left-beating after head-shake nystagmus. (-) fixation suppression; square wave present with fixation light.   Static Positional test was absent for nystagmus.   Supine Center: square-wave jerk nystagmus without fixation   Supine Head Right: square-wave jerk nystagmus without and with fixation   Supine Head Left: square-wave jerk nystagmus without fixation   Body Right: square-wave nystagmus without fixation; slight jerk nystagmus with fixation   Body Left: absent for nystagmus without fixation; slight jerk nystagmus with fixation   Sitting Head Right: absent   Sitting Head Left: absent  [place]-Hallpike Right was negative for BPPV. There was slight 2 d/s right-beating nystagmus to head-hanging that changed to square wave burst nystagmus with fixation. Patient reported light moving "up" direction.   [place]-Hallpike Left was negative for BPPV. There was square wave nystagmus to head hanging that suppressed with " "fixation.  Patient reported light was moving "up" direction.   Bi-thermal caloric test was Normal.  Fixation suppression following caloric irrigations was normal.  The following values were obtained:  Unilateral weakness (UW): 17% left ear  Directional preponderance (DP): 11% left beating  RC: 20 d/s    d/s   RW: 18 d/s   LW: 16 d/s    Summary: Abnormal VNG, demonstrated by square-wave jerk nystagmus to spontaneous, positional and sinusoidal pursuit tests, with and without visual fixation.  These are indicative of central vestibular involvement.  Negative for BPPV.  No caloric weakness or asymmetry.      Recommendations:  1. ENT  2. Hearing aids, binaural. Patient was provided a copy of his audiogram.     Patient was counseled on the above findings.  Tracings are to be scanned.                  "

## 2020-06-15 ENCOUNTER — TELEPHONE (OUTPATIENT)
Dept: OTOLARYNGOLOGY | Facility: CLINIC | Age: 65
End: 2020-06-15

## 2020-06-15 NOTE — TELEPHONE ENCOUNTER
Spoke with pt and informed of the results and suggestions from dr Cadet. Pt verbalized understanding.      ----- Message from Shorty Cadet MD sent at 6/14/2020  9:09 PM CDT -----  Please let him know that his testing showed no dysfunction of the ear that should cause dizziness/imbalance.  If he continues to have symptoms, he should see a neurologist.  ----- Message -----  From: Jazzy Brown, CCC-A  Sent: 6/11/2020   3:50 PM CDT  To: Shorty Cadet MD    VNG: central indicators vs age-related. Jerk nystagmus wasn't reported in 2010 VNG (not present vs not identified?) Had normal MRI 2010.   EcoG: no response as expected with hearing loss (tried since ordered)  ABR: normal.  Interesting abt poorer response to condensation but of what clinical significance? Tested abr since central vng.   Audio: no significant change over past year    Please f/u with patient your plan.

## 2021-05-06 ENCOUNTER — PATIENT MESSAGE (OUTPATIENT)
Dept: RESEARCH | Facility: HOSPITAL | Age: 66
End: 2021-05-06

## 2023-05-27 ENCOUNTER — OFFICE VISIT (OUTPATIENT)
Dept: URGENT CARE | Facility: CLINIC | Age: 68
End: 2023-05-27
Payer: COMMERCIAL

## 2023-05-27 VITALS
DIASTOLIC BLOOD PRESSURE: 88 MMHG | RESPIRATION RATE: 22 BRPM | HEART RATE: 74 BPM | SYSTOLIC BLOOD PRESSURE: 168 MMHG | OXYGEN SATURATION: 96 % | TEMPERATURE: 99 F | WEIGHT: 240 LBS | HEIGHT: 70 IN | BODY MASS INDEX: 34.36 KG/M2

## 2023-05-27 DIAGNOSIS — L03.115 CELLULITIS OF RIGHT KNEE: Primary | ICD-10-CM

## 2023-05-27 DIAGNOSIS — M25.461 PAIN AND SWELLING OF RIGHT KNEE: ICD-10-CM

## 2023-05-27 DIAGNOSIS — M25.561 PAIN AND SWELLING OF RIGHT KNEE: ICD-10-CM

## 2023-05-27 PROCEDURE — 99203 PR OFFICE/OUTPT VISIT, NEW, LEVL III, 30-44 MIN: ICD-10-PCS | Mod: S$GLB,,, | Performed by: NURSE PRACTITIONER

## 2023-05-27 PROCEDURE — 99203 OFFICE O/P NEW LOW 30 MIN: CPT | Mod: S$GLB,,, | Performed by: NURSE PRACTITIONER

## 2023-05-27 PROCEDURE — 73562 X-RAY EXAM OF KNEE 3: CPT | Mod: RT,S$GLB,, | Performed by: RADIOLOGY

## 2023-05-27 PROCEDURE — 73562 XR KNEE 3 VIEW RIGHT: ICD-10-PCS | Mod: RT,S$GLB,, | Performed by: RADIOLOGY

## 2023-05-27 RX ORDER — SILDENAFIL 100 MG/1
100 TABLET, FILM COATED ORAL DAILY PRN
COMMUNITY
Start: 2022-08-30

## 2023-05-27 RX ORDER — MELOXICAM 7.5 MG/1
7.5 TABLET ORAL DAILY PRN
Qty: 30 TABLET | Refills: 0 | Status: SHIPPED | OUTPATIENT
Start: 2023-05-27

## 2023-05-27 RX ORDER — LOSARTAN POTASSIUM 25 MG/1
25 TABLET ORAL EVERY MORNING
COMMUNITY
Start: 2023-05-12

## 2023-05-27 RX ORDER — CLINDAMYCIN HYDROCHLORIDE 300 MG/1
300 CAPSULE ORAL 3 TIMES DAILY
Qty: 21 CAPSULE | Refills: 0 | Status: SHIPPED | OUTPATIENT
Start: 2023-05-27 | End: 2023-06-03

## 2023-05-27 RX ORDER — MUPIROCIN 20 MG/G
OINTMENT TOPICAL 3 TIMES DAILY
COMMUNITY
Start: 2023-05-19

## 2023-05-27 NOTE — PROGRESS NOTES
"Subjective:      Patient ID: Gavin Webster is a 67 y.o. male.    Vitals:  height is 5' 10" (1.778 m) and weight is 108.9 kg (240 lb). His oral temperature is 98.7 °F (37.1 °C). His blood pressure is 168/88 (abnormal) and his pulse is 74. His respiration is 22 (abnormal) and oxygen saturation is 96%.     Chief Complaint: Knee Pain (right) and Leg Injury    Patient complaining of R anteriolateral knee pain over past 2-3 days with worsening over past 12-24 hrs; + mild swelling and tenderness to touch; No recalled trauma to that area;  Hx of dog scratch to  central R knee surface area 1 week ago with localized erythema;  Suspects Td/tdap UTD;  Some fatigue but no fever or chills     Knee Pain   The incident occurred 12 to 24 hours ago. Incident location: no known injury. The pain is present in the right knee. The quality of the pain is described as aching. The pain is at a severity of 5/10. The pain is moderate. The pain has been Intermittent since onset. Pertinent negatives include no inability to bear weight, loss of motion, loss of sensation, muscle weakness, numbness or tingling. Associated symptoms comments: Pain with palpitation or bumping.  . He reports no foreign bodies present. The symptoms are aggravated by movement, palpation and weight bearing. He has tried nothing for the symptoms.     Constitution: Positive for chills. Negative for fever and generalized weakness.   Musculoskeletal:  Positive for pain and joint pain.   Skin:  Positive for abrasion and erythema.   Neurological:  Negative for numbness.    Objective:     Vitals:    05/27/23 1410   BP: (!) 168/88   BP Location: Left arm   Patient Position: Sitting   BP Method: Medium (Manual)   Pulse: 74   Resp: (!) 22   Temp: 98.7 °F (37.1 °C)   TempSrc: Oral   SpO2: 96%   Weight: 108.9 kg (240 lb)   Height: 5' 10" (1.778 m)       Physical Exam   Constitutional: He is oriented to person, place, and time. He appears well-developed. He is cooperative.   HENT: "   Head: Normocephalic and atraumatic.   Ears:   Right Ear: Hearing normal.   Left Ear: Hearing normal.   Nose: No mucosal edema or nasal deformity. No epistaxis. Right sinus exhibits no maxillary sinus tenderness and no frontal sinus tenderness. Left sinus exhibits no maxillary sinus tenderness and no frontal sinus tenderness.   Mouth/Throat: Uvula is midline, oropharynx is clear and moist and mucous membranes are normal. No trismus in the jaw. Normal dentition. No uvula swelling.   Eyes: Conjunctivae and lids are normal.   Neck: Trachea normal and phonation normal. Neck supple.   Cardiovascular: Normal rate, regular rhythm, normal heart sounds and normal pulses.   Pulmonary/Chest: Effort normal and breath sounds normal.   Abdominal: Normal appearance.   Musculoskeletal: Normal range of motion.         General: Swelling and tenderness present. Normal range of motion.      Comments: R anterior lateral knee + tenderness, > erythema and warmth; no palpable effusion;       Neurological: He is alert and oriented to person, place, and time. He exhibits normal muscle tone.   Skin: Skin is warm, dry and intact. erythema   Psychiatric: His speech is normal and behavior is normal. Mood and thought content normal.   Nursing note and vitals reviewed.    Assessment:     1. Cellulitis of right knee    2. Pain and swelling of right knee    Radiology Procedure Done: Right Knee.  Interpretation: Negative for fracture, effusion or dislocation; Noted arthritic changes as read by me; Final reading pending          X-Ray Knee 3 View Right    Result Date: 5/27/2023  EXAM:  XR KNEE 3 VIEW RIGHT CLINICAL HISTORY:    Right knee joint pain TECHNIQUE: 3 views of the right knee. COMPARISON: None. FINDINGS:    The medial and lateral knee compartments appear normal.  There is minor ventral patellar spurring at the distal quadriceps tendon insertion.  No obvious joint effusion. No acute findings.      See above. Finalized on: 5/27/2023 2:42 PM  By:  Omega Jang MD [facility]# [id number]      2023-05-27 14:45:[id number]    [facility]     Plan:       Cellulitis of right knee  -     clindamycin (CLEOCIN) 300 MG capsule; Take 1 capsule (300 mg total) by mouth 3 (three) times daily. for 7 days  Dispense: 21 capsule; Refill: 0  -     meloxicam (MOBIC) 7.5 MG tablet; Take 1 tablet (7.5 mg total) by mouth daily as needed for Pain (joint swelling).  Dispense: 30 tablet; Refill: 0    Pain and swelling of right knee  -     X-Ray Knee 3 View Right; Future; Expected date: 05/27/2023  -     clindamycin (CLEOCIN) 300 MG capsule; Take 1 capsule (300 mg total) by mouth 3 (three) times daily. for 7 days  Dispense: 21 capsule; Refill: 0  -     meloxicam (MOBIC) 7.5 MG tablet; Take 1 tablet (7.5 mg total) by mouth daily as needed for Pain (joint swelling).  Dispense: 30 tablet; Refill: 0      Patient Instructions   Rest activity ad kasia   Elevate as often as possible   Monitor for >pain redness extending upward; fever chills or weakness follow up to [facility]  or local ER for further evaluation and hospital care     F/U with PCP in 3-5 days;

## 2023-05-27 NOTE — PATIENT INSTRUCTIONS
Rest activity ad kasia   Elevate as often as possible   Monitor for >pain redness extending upward; fever chills or weakness follow up to [facility]  or local ER for further evaluation and hospital care     F/U with PCP in 3-5 days;

## 2023-10-05 ENCOUNTER — OFFICE VISIT (OUTPATIENT)
Dept: UROLOGY | Facility: CLINIC | Age: 68
End: 2023-10-05
Payer: COMMERCIAL

## 2023-10-05 ENCOUNTER — LAB VISIT (OUTPATIENT)
Dept: LAB | Facility: HOSPITAL | Age: 68
End: 2023-10-05
Attending: UROLOGY
Payer: COMMERCIAL

## 2023-10-05 VITALS
DIASTOLIC BLOOD PRESSURE: 84 MMHG | SYSTOLIC BLOOD PRESSURE: 154 MMHG | WEIGHT: 238.63 LBS | BODY MASS INDEX: 34.16 KG/M2 | HEIGHT: 70 IN | TEMPERATURE: 98 F | HEART RATE: 68 BPM

## 2023-10-05 DIAGNOSIS — N52.9 ERECTILE DYSFUNCTION, UNSPECIFIED ERECTILE DYSFUNCTION TYPE: ICD-10-CM

## 2023-10-05 DIAGNOSIS — N40.1 BENIGN PROSTATIC HYPERPLASIA WITH WEAK URINARY STREAM: Primary | ICD-10-CM

## 2023-10-05 DIAGNOSIS — R39.12 BENIGN PROSTATIC HYPERPLASIA WITH WEAK URINARY STREAM: Primary | ICD-10-CM

## 2023-10-05 LAB
BILIRUB SERPL-MCNC: NEGATIVE MG/DL
BLOOD URINE, POC: NORMAL
CLARITY, POC UA: CLEAR
COLOR, POC UA: NORMAL
GLUCOSE UR QL STRIP: NEGATIVE
KETONES UR QL STRIP: NEGATIVE
LEUKOCYTE ESTERASE URINE, POC: NEGATIVE
NITRITE, POC UA: NEGATIVE
PH, POC UA: 7
PROTEIN, POC: NEGATIVE
SPECIFIC GRAVITY, POC UA: 1.03
TESTOST SERPL-MCNC: 418 NG/DL (ref 304–1227)
UROBILINOGEN, POC UA: 0.2

## 2023-10-05 PROCEDURE — 1159F MED LIST DOCD IN RCRD: CPT | Mod: CPTII,S$GLB,, | Performed by: UROLOGY

## 2023-10-05 PROCEDURE — 3288F PR FALLS RISK ASSESSMENT DOCUMENTED: ICD-10-PCS | Mod: CPTII,S$GLB,, | Performed by: UROLOGY

## 2023-10-05 PROCEDURE — 1159F PR MEDICATION LIST DOCUMENTED IN MEDICAL RECORD: ICD-10-PCS | Mod: CPTII,S$GLB,, | Performed by: UROLOGY

## 2023-10-05 PROCEDURE — 81002 POCT URINE DIPSTICK WITHOUT MICROSCOPE: ICD-10-PCS | Mod: S$GLB,,, | Performed by: UROLOGY

## 2023-10-05 PROCEDURE — 99204 OFFICE O/P NEW MOD 45 MIN: CPT | Mod: S$GLB,,, | Performed by: UROLOGY

## 2023-10-05 PROCEDURE — 81002 URINALYSIS NONAUTO W/O SCOPE: CPT | Mod: S$GLB,,, | Performed by: UROLOGY

## 2023-10-05 PROCEDURE — 1126F AMNT PAIN NOTED NONE PRSNT: CPT | Mod: CPTII,S$GLB,, | Performed by: UROLOGY

## 2023-10-05 PROCEDURE — 99999 PR PBB SHADOW E&M-EST. PATIENT-LVL IV: CPT | Mod: PBBFAC,,, | Performed by: UROLOGY

## 2023-10-05 PROCEDURE — 3077F SYST BP >= 140 MM HG: CPT | Mod: CPTII,S$GLB,, | Performed by: UROLOGY

## 2023-10-05 PROCEDURE — 3044F HG A1C LEVEL LT 7.0%: CPT | Mod: CPTII,S$GLB,, | Performed by: UROLOGY

## 2023-10-05 PROCEDURE — 99204 PR OFFICE/OUTPT VISIT, NEW, LEVL IV, 45-59 MIN: ICD-10-PCS | Mod: S$GLB,,, | Performed by: UROLOGY

## 2023-10-05 PROCEDURE — 3077F PR MOST RECENT SYSTOLIC BLOOD PRESSURE >= 140 MM HG: ICD-10-PCS | Mod: CPTII,S$GLB,, | Performed by: UROLOGY

## 2023-10-05 PROCEDURE — 1126F PR PAIN SEVERITY QUANTIFIED, NO PAIN PRESENT: ICD-10-PCS | Mod: CPTII,S$GLB,, | Performed by: UROLOGY

## 2023-10-05 PROCEDURE — 1160F PR REVIEW ALL MEDS BY PRESCRIBER/CLIN PHARMACIST DOCUMENTED: ICD-10-PCS | Mod: CPTII,S$GLB,, | Performed by: UROLOGY

## 2023-10-05 PROCEDURE — 3008F PR BODY MASS INDEX (BMI) DOCUMENTED: ICD-10-PCS | Mod: CPTII,S$GLB,, | Performed by: UROLOGY

## 2023-10-05 PROCEDURE — 3044F PR MOST RECENT HEMOGLOBIN A1C LEVEL <7.0%: ICD-10-PCS | Mod: CPTII,S$GLB,, | Performed by: UROLOGY

## 2023-10-05 PROCEDURE — 3008F BODY MASS INDEX DOCD: CPT | Mod: CPTII,S$GLB,, | Performed by: UROLOGY

## 2023-10-05 PROCEDURE — 1101F PT FALLS ASSESS-DOCD LE1/YR: CPT | Mod: CPTII,S$GLB,, | Performed by: UROLOGY

## 2023-10-05 PROCEDURE — 1101F PR PT FALLS ASSESS DOC 0-1 FALLS W/OUT INJ PAST YR: ICD-10-PCS | Mod: CPTII,S$GLB,, | Performed by: UROLOGY

## 2023-10-05 PROCEDURE — 99999 PR PBB SHADOW E&M-EST. PATIENT-LVL IV: ICD-10-PCS | Mod: PBBFAC,,, | Performed by: UROLOGY

## 2023-10-05 PROCEDURE — 84403 ASSAY OF TOTAL TESTOSTERONE: CPT | Performed by: UROLOGY

## 2023-10-05 PROCEDURE — 3079F PR MOST RECENT DIASTOLIC BLOOD PRESSURE 80-89 MM HG: ICD-10-PCS | Mod: CPTII,S$GLB,, | Performed by: UROLOGY

## 2023-10-05 PROCEDURE — 4010F ACE/ARB THERAPY RXD/TAKEN: CPT | Mod: CPTII,S$GLB,, | Performed by: UROLOGY

## 2023-10-05 PROCEDURE — 3079F DIAST BP 80-89 MM HG: CPT | Mod: CPTII,S$GLB,, | Performed by: UROLOGY

## 2023-10-05 PROCEDURE — 36415 COLL VENOUS BLD VENIPUNCTURE: CPT | Performed by: UROLOGY

## 2023-10-05 PROCEDURE — 3288F FALL RISK ASSESSMENT DOCD: CPT | Mod: CPTII,S$GLB,, | Performed by: UROLOGY

## 2023-10-05 PROCEDURE — 4010F PR ACE/ARB THEARPY RXD/TAKEN: ICD-10-PCS | Mod: CPTII,S$GLB,, | Performed by: UROLOGY

## 2023-10-05 PROCEDURE — 1160F RVW MEDS BY RX/DR IN RCRD: CPT | Mod: CPTII,S$GLB,, | Performed by: UROLOGY

## 2023-10-05 RX ORDER — TADALAFIL 20 MG/1
20 TABLET ORAL DAILY
Qty: 30 TABLET | Refills: 11 | Status: SHIPPED | OUTPATIENT
Start: 2023-10-05 | End: 2024-10-04

## 2023-10-05 NOTE — PROGRESS NOTES
Chief Complaint:  BPH and ED    HPI:   Gavin Webster is a 67 y.o. male that presents today for BPH and ED.  Patient notes a long history of with BPH.  He notes that he was started on Cardura at age 28 after an episode of prostatitis.  Notes that the still works well for him, can really tell drop off in his stream when he misses a dose.  No further episodes of prostatitis since that initial one.  Overall feels like his flow is okay, feels like he empties well.  Denies gross hematuria but does have history of microscopic hematuria and underwent cystoscopy many years ago.  He is a nonsmoker.  Also notes issues with ED, has issues with both achieving and maintaining.  Sildenafil used to be quite effective for him, but now notes that it sometimes does not work.  Has never tried Cialis or Levitra.  Does get sinus congestion and headaches with it but not too bad.  Does have a family history of kidney cancer in his mother but no other family history of  cancers.  Denies kidney stones.  Denies prior urologic procedures.    PMH:  Past Medical History:   Diagnosis Date    BPH (benign prostatic hyperplasia)     Hyperlipidemia     Thyroid disease        PSH:  Past Surgical History:   Procedure Laterality Date    COLONOSCOPY N/A 6/8/2018    Procedure: COLONOSCOPY okay by Samuel;  Surgeon: Elvis Baker III, MD;  Location: [facility];  Service: Endoscopy;  Laterality: N/A;    THYROIDECTOMY  2008       Family History:  Family History   Problem Relation Age of Onset    Cancer Mother     Cancer Father        Social History:  Social History     Tobacco Use    Smoking status: Never    Smokeless tobacco: Never   Substance Use Topics    Alcohol use: Yes     Alcohol/week: 0.0 standard drinks of alcohol     Comment: 3-4 beer/mthly    Drug use: No        Review of Systems:  General: No fever, chills  Skin: No rashes  Chest:  Denies cough and sputum production  Heart: Denies chest pain  Resp: Denies dyspnea  Abdomen: Denies diarrhea,  abdominal pain, hematemesis, or blood in stool.  Musculoskeletal: No joint stiffness or swelling. Denies back pain.  : see HPI  Neuro: no dizziness or weakness    Allergies:  Patient has no known allergies.    Medications:    Current Outpatient Medications:     doxazosin (CARDURA) 8 MG Tab, TAKE 1 TABLET BY MOUTH AT BEDTIME, Disp: 90 tablet, Rfl: 0    levothyroxine (SYNTHROID) 137 MCG Tab tablet, Take 1 tablet (137 mcg total) by mouth once daily., Disp: 90 tablet, Rfl: 3    losartan (COZAAR) 25 MG tablet, Take 25 mg by mouth every morning., Disp: , Rfl:     meloxicam (MOBIC) 7.5 MG tablet, Take 1 tablet (7.5 mg total) by mouth daily as needed for Pain (joint swelling)., Disp: 30 tablet, Rfl: 0    mupirocin (BACTROBAN) 2 % ointment, Apply topically 3 (three) times daily., Disp: , Rfl:     sildenafiL (VIAGRA) 100 MG tablet, Take 100 mg by mouth daily as needed., Disp: , Rfl:     amLODIPine (NORVASC) 5 MG tablet, Take 5 mg by mouth., Disp: , Rfl:     pravastatin (PRAVACHOL) 20 MG tablet, Take 20 mg by mouth., Disp: , Rfl:     tadalafiL (CIALIS) 20 MG Tab, Take 1 tablet (20 mg total) by mouth once daily., Disp: 30 tablet, Rfl: 11    Physical Exam:  Vitals:    10/05/23 0753   BP: (!) 154/84   Pulse: 68   Temp: 97.8 °F (36.6 °C)     Body mass index is 34.24 kg/m².  General: awake, alert, cooperative  Head: NC/AT  Ears: external ears normal  Eyes: sclera normal  Lungs: normal inspiration, NAD  Heart: well-perfused  Abdomen: Soft, NT, ND  : Normal uncirc'd phallus, meatus normal in size and position, BL testicles palpable, no masses, nontender, no abnormalities of epididymi  VERONIKA: Normal rectal tone, no hemorrhoids. Prostate smooth and normal, no nodules 40 gm SV not palpable. Perineum and anus normal.  Lymphatic: groin nodes negative  Skin: The skin is warm and dry  Ext: No c/c/e.  Neuro: grossly intact, AOx3    RADIOLOGY:  No recent relevant imaging available for review.    LABS:  I personally reviewed the following  lab values:  Lab Results   Component Value Date    WBC 9.8 02/24/2023    HGB 14.7 02/24/2023    HCT 44.2 02/24/2023     02/24/2023     06/02/2014    K 4.2 06/02/2014     06/02/2014    CREATININE 1.4 06/02/2014    BUN 13 06/02/2014    CO2 27 06/02/2014    TSH 0.789 08/18/2023    PSA 0.91 05/01/2013    GLUF 91 02/16/2009    HGBA1C 5.8 (H) 02/24/2023    CHOL 187 02/24/2023    TRIG 82 02/24/2023    HDL 38 (L) 02/24/2023    ALT 23 06/02/2014       URINALYSIS:  Urinalysis obtained in clinic today specific gravity > 1.030 pH seven trace intact blood, negative for all other parameters    Assessment/Plan:   Gavin Webster is a 67 y.o. male with:    BPH - continue cardura    ED - discussed options, trial of cialis PRN, T lvl today    Prostate Cancer Screening - PSA and VERONIKA normal, continue annual screening    - follow-up one year    Shorty Thomas MD  Urology

## 2023-11-08 ENCOUNTER — PATIENT MESSAGE (OUTPATIENT)
Dept: UROLOGY | Facility: CLINIC | Age: 68
End: 2023-11-08
Payer: COMMERCIAL

## 2025-08-04 DIAGNOSIS — Z76.89 ENCOUNTER TO ESTABLISH CARE: Primary | ICD-10-CM

## 2025-08-04 DIAGNOSIS — Z00.00 ROUTINE HEALTH MAINTENANCE: ICD-10-CM

## 2025-08-07 PROBLEM — I10 ESSENTIAL HYPERTENSION: Status: ACTIVE | Noted: 2025-08-07

## 2025-08-07 PROBLEM — G47.33 OSA (OBSTRUCTIVE SLEEP APNEA): Status: ACTIVE | Noted: 2025-08-07

## 2025-08-07 NOTE — PROGRESS NOTES
Subjective   Patient ID:  Gavin Webster is a 69 y.o. male who presents for evaluation of Chest Pain        HPI  Pt presents for cardiac eval.  Pt has HTN, CRI, hyperlipidemia, JOVON, pre-DM, h/o thyroid cancer, GERD.  Nonsmoker.  Had chest pain in 2008, s/p LHC @ [facility] and no blockages per pt.  Saw [facility] few times, negative stress test.  Has chronic CONTEH, and chronic palpitations.  Infrequent chest pain sxs, chronic.  Labs reviewed.  Stable labs.  Uses CPAP.  Ecg today 8/8/25 personally reviewed: sinus bradycardia 54 bpm, no ischemia or infarct.        Past Medical History:   Diagnosis Date    BPH (benign prostatic hyperplasia)     Essential hypertension 8/7/2025    Hyperlipidemia     Thyroid disease        Current Outpatient Medications   Medication Instructions    amLODIPine (NORVASC) 5 mg    doxazosin (CARDURA) 8 MG Tab TAKE 1 TABLET BY MOUTH AT BEDTIME    levothyroxine (SYNTHROID) 137 mcg, Oral, Daily    pantoprazole (PROTONIX) 40 mg, Every morning    pravastatin (PRAVACHOL) 20 mg    tadalafiL (CIALIS) 20 mg, Oral, Daily         Review of Systems   Constitutional: Negative.   HENT: Negative.     Eyes: Negative.    Cardiovascular:  Positive for chest pain, dyspnea on exertion and palpitations.   Respiratory:  Positive for shortness of breath.    Endocrine: Negative.    Hematologic/Lymphatic: Negative.    Skin: Negative.    Musculoskeletal:  Positive for arthritis and joint pain.   Gastrointestinal: Negative.    Genitourinary: Negative.    Neurological: Negative.    Psychiatric/Behavioral: Negative.     Allergic/Immunologic: Negative.        /78 (BP Location: Left arm, Patient Position: Sitting)   Pulse (!) 58   Wt 110.3 kg (243 lb 2.7 oz)   SpO2 98%   BMI 34.89 kg/m²     Wt Readings from Last 3 Encounters:   08/08/25 110.3 kg (243 lb 2.7 oz)   10/05/23 108.3 kg (238 lb 10.4 oz)   05/27/23 108.9 kg (240 lb)     Temp Readings from Last 3 Encounters:   10/05/23 97.8 °F (36.6 °C)    05/27/23 98.7 °F (37.1 °C) (Oral)   06/04/20 97.1 °F (36.2 °C) (Tympanic)     BP Readings from Last 3 Encounters:   08/08/25 138/78   10/05/23 (!) 154/84   05/27/23 (!) 168/88     Pulse Readings from Last 3 Encounters:   08/08/25 (!) 58   10/05/23 68   05/27/23 74            Objective     Physical Exam  Vitals and nursing note reviewed.   Constitutional:       Appearance: He is well-developed.   HENT:      Head: Normocephalic.   Neck:      Thyroid: No thyromegaly.      Vascular: Normal carotid pulses. No carotid bruit, hepatojugular reflux or JVD.   Cardiovascular:      Rate and Rhythm: Regular rhythm. Bradycardia present.      Pulses:           Radial pulses are 2+ on the right side and 2+ on the left side.      Heart sounds: S1 normal and S2 normal. Heart sounds not distant. No midsystolic click and no opening snap. No murmur heard.     No friction rub. No S3 or S4 sounds.   Pulmonary:      Effort: Pulmonary effort is normal.      Breath sounds: Normal breath sounds. No wheezing or rales.   Abdominal:      General: Bowel sounds are normal. There is no distension or abdominal bruit.      Palpations: Abdomen is soft.      Tenderness: There is no abdominal tenderness.   Musculoskeletal:      Cervical back: Normal range of motion and neck supple.   Skin:     General: Skin is warm.   Neurological:      Mental Status: He is alert and oriented to person, place, and time.   Psychiatric:         Behavior: Behavior normal.         I have reviewed all pertinent labs and cardiac studies.        Chemistry        Component Value Date/Time     06/02/2014 0950    K 4.2 06/02/2014 0950     06/02/2014 0950    CO2 27 06/02/2014 0950    BUN 13 06/02/2014 0950    CREATININE 1.4 06/02/2014 0950     06/02/2014 0950        Component Value Date/Time    CALCIUM 9.3 06/02/2014 0950    ALT 23 06/02/2014 0950    ESTGFRAFRICA >60.0 06/02/2014 0950    EGFRNONAA 55.8 (A) 06/02/2014 0950        Lab Results   Component Value  Date    WBC 6 05/23/2025    HGB 15.1 05/23/2025    HCT 46.2 05/23/2025    MCV 87 08/19/2014     05/23/2025       Lab Results   Component Value Date    HGBA1C 5.9 (H) 05/23/2025       Lab Results   Component Value Date    CHOL 182 05/23/2025    CHOL 188 08/01/2024    CHOL 196 11/07/2023      Lab Results   Component Value Date    HDL 39 (L) 05/23/2025    HDL 41 08/01/2024    HDL 39 (L) 11/07/2023     Lab Results   Component Value Date    LDLCALC 125 (H) 05/23/2025    LDLCALC 133 (H) 08/01/2024    LDLCALC 136 (H) 11/07/2023      Lab Results   Component Value Date    TRIG 96 05/23/2025    TRIG 76 08/01/2024    TRIG 116 11/07/2023     Lab Results   Component Value Date    TOTALCHOLEST 3.7 06/02/2014    TOTALCHOLEST 3.4 05/01/2013    TOTALCHOLEST 3.8 04/25/2012     Lab Results   Component Value Date    NONHDLCHOL 109 06/02/2014     Lab Results   Component Value Date    CHOLHDL 27.3 06/02/2014    CHOLHDL 29.8 05/01/2013    CHOLHDL 26.6 04/25/2012            Assessment and Plan     1. Atypical chest pain    2. Palpitations    3. Essential hypertension    4. JOVON (obstructive sleep apnea)    5. Hyperlipidemia, mixed    6. CONTEH (dyspnea on exertion)    7. Class 1 obesity due to excess calories with serious comorbidity and body mass index (BMI) of 34.0 to 34.9 in adult        Plan:      Stress echocardiogram.  Chest pain: Atypical features, chronic.  S/p C approx 2008, no blockages.  Palpitations: Chronic. Monitor. Holter prn.  JOVON: CPAP.  HTN: goal < 130/80.  Home BP monitoring advised.  Continue current HTN meds.  Lipids: Low cholesterol diet/statin tx.  Obesity: Exercise/weight loss advised.    PHONE REVIEW.          I have reviewed all pertinent labs and cardiac studies independently. Plans and recommendations have been formulated under my direct supervision. All questions answered and patient voiced understanding.

## 2025-08-08 ENCOUNTER — OFFICE VISIT (OUTPATIENT)
Dept: CARDIOLOGY | Facility: CLINIC | Age: 70
End: 2025-08-08
Payer: MEDICARE

## 2025-08-08 ENCOUNTER — HOSPITAL ENCOUNTER (OUTPATIENT)
Dept: CARDIOLOGY | Facility: HOSPITAL | Age: 70
Discharge: HOME | End: 2025-08-08
Attending: INTERNAL MEDICINE
Payer: MEDICARE

## 2025-08-08 VITALS
DIASTOLIC BLOOD PRESSURE: 78 MMHG | WEIGHT: 243.19 LBS | BODY MASS INDEX: 34.89 KG/M2 | SYSTOLIC BLOOD PRESSURE: 138 MMHG | OXYGEN SATURATION: 98 % | HEART RATE: 58 BPM

## 2025-08-08 DIAGNOSIS — E66.811 CLASS 1 OBESITY DUE TO EXCESS CALORIES WITH SERIOUS COMORBIDITY AND BODY MASS INDEX (BMI) OF 34.0 TO 34.9 IN ADULT: ICD-10-CM

## 2025-08-08 DIAGNOSIS — Z00.00 ROUTINE HEALTH MAINTENANCE: ICD-10-CM

## 2025-08-08 DIAGNOSIS — E78.2 HYPERLIPIDEMIA, MIXED: ICD-10-CM

## 2025-08-08 DIAGNOSIS — I10 ESSENTIAL HYPERTENSION: ICD-10-CM

## 2025-08-08 DIAGNOSIS — R00.2 PALPITATIONS: ICD-10-CM

## 2025-08-08 DIAGNOSIS — E66.09 CLASS 1 OBESITY DUE TO EXCESS CALORIES WITH SERIOUS COMORBIDITY AND BODY MASS INDEX (BMI) OF 34.0 TO 34.9 IN ADULT: ICD-10-CM

## 2025-08-08 DIAGNOSIS — G47.33 OSA (OBSTRUCTIVE SLEEP APNEA): ICD-10-CM

## 2025-08-08 DIAGNOSIS — Z76.89 ENCOUNTER TO ESTABLISH CARE: ICD-10-CM

## 2025-08-08 DIAGNOSIS — R06.09 DOE (DYSPNEA ON EXERTION): ICD-10-CM

## 2025-08-08 DIAGNOSIS — R07.89 ATYPICAL CHEST PAIN: Primary | ICD-10-CM

## 2025-08-08 LAB
OHS QRS DURATION: 110 MS
OHS QTC CALCULATION: 428 MS

## 2025-08-08 PROCEDURE — 93010 ELECTROCARDIOGRAM REPORT: CPT | Mod: ,,, | Performed by: INTERNAL MEDICINE

## 2025-08-08 PROCEDURE — 93005 ELECTROCARDIOGRAM TRACING: CPT

## 2025-08-08 PROCEDURE — 99999 PR PBB SHADOW E&M-EST. PATIENT-LVL III: CPT | Mod: PBBFAC,,, | Performed by: INTERNAL MEDICINE

## 2025-08-08 RX ORDER — PANTOPRAZOLE SODIUM 40 MG/1
40 TABLET, DELAYED RELEASE ORAL EVERY MORNING
COMMUNITY
Start: 2025-06-26

## 2025-08-14 ENCOUNTER — HOSPITAL ENCOUNTER (OUTPATIENT)
Dept: CARDIOLOGY | Facility: HOSPITAL | Age: 70
Discharge: HOME | End: 2025-08-14
Attending: INTERNAL MEDICINE
Payer: MEDICARE

## 2025-08-14 VITALS
WEIGHT: 243 LBS | HEART RATE: 52 BPM | HEIGHT: 70 IN | BODY MASS INDEX: 34.79 KG/M2 | SYSTOLIC BLOOD PRESSURE: 135 MMHG | DIASTOLIC BLOOD PRESSURE: 72 MMHG

## 2025-08-14 DIAGNOSIS — R06.09 DOE (DYSPNEA ON EXERTION): ICD-10-CM

## 2025-08-14 DIAGNOSIS — I10 ESSENTIAL HYPERTENSION: ICD-10-CM

## 2025-08-14 DIAGNOSIS — G47.33 OSA (OBSTRUCTIVE SLEEP APNEA): ICD-10-CM

## 2025-08-14 DIAGNOSIS — R00.2 PALPITATIONS: ICD-10-CM

## 2025-08-14 DIAGNOSIS — R07.89 ATYPICAL CHEST PAIN: ICD-10-CM

## 2025-08-14 LAB
AORTIC ROOT ANNULUS: 3.8 CM
AORTIC SIZE INDEX (SOV): 1.6 CM/M2
AV INDEX (PROSTH): 0.87
AV MEAN GRADIENT: 6 MMHG
AV PEAK GRADIENT: 10 MMHG
AV REGURGITATION PRESSURE HALF TIME: 620 MS
AV VALVE AREA BY VELOCITY RATIO: 2.6 CM²
AV VALVE AREA: 3 CM²
AV VELOCITY RATIO: 0.75
BSA FOR ECHO PROCEDURE: 2.33 M2
CV ECHO LV RWT: 0.31 CM
CV STRESS BASE HR: 50 BPM
DIASTOLIC BLOOD PRESSURE: 72 MMHG
DOP CALC AO PEAK VEL: 1.6 M/S
DOP CALC AO VTI: 36.1 CM
DOP CALC LVOT AREA: 3.5 CM2
DOP CALC LVOT DIAMETER: 2.1 CM
DOP CALC LVOT PEAK VEL: 1.2 M/S
DOP CALC RVOT PEAK VEL: 0.89 M/S
DOP CALC RVOT VTI: 23.2 CM
DOP CALCLVOT PEAK VEL VTI: 31.4 CM
E WAVE DECELERATION TIME: 270 MSEC
E/A RATIO: 1.08
E/E' RATIO: 9 M/S
ECHO LV POSTERIOR WALL: 0.9 CM (ref 0.6–1.1)
FRACTIONAL SHORTENING: 41.4 % (ref 28–44)
INTERVENTRICULAR SEPTUM: 1.1 CM (ref 0.6–1.1)
IVRT: 106 MSEC
LA MAJOR: 4.6 CM
LA MINOR: 4.5 CM
LA WIDTH: 3.8 CM
LEFT ATRIUM SIZE: 4 CM
LEFT ATRIUM VOLUME INDEX: 26 ML/M2
LEFT ATRIUM VOLUME: 59 CM3
LEFT INTERNAL DIMENSION IN SYSTOLE: 3.4 CM (ref 2.1–4)
LEFT VENTRICLE DIASTOLIC VOLUME INDEX: 72.69 ML/M2
LEFT VENTRICLE DIASTOLIC VOLUME: 165 ML
LEFT VENTRICLE MASS INDEX: 102.7 G/M2
LEFT VENTRICLE SYSTOLIC VOLUME INDEX: 20.7 ML/M2
LEFT VENTRICLE SYSTOLIC VOLUME: 47 ML
LEFT VENTRICULAR INTERNAL DIMENSION IN DIASTOLE: 5.8 CM (ref 3.5–6)
LEFT VENTRICULAR MASS: 233.1 G
LV LATERAL E/E' RATIO: 7 M/S
LV SEPTAL E/E' RATIO: 11 M/S
LVED V (TEICH): 165.23 ML
LVES V (TEICH): 46.99 ML
LVOT MG: 3.61 MMHG
LVOT MV: 0.91 CM/S
Lab: 2 CM/M
MV PEAK A VEL: 0.71 M/S
MV PEAK E VEL: 0.77 M/S
MV STENOSIS PRESSURE HALF TIME: 78.3 MS
MV VALVE AREA P 1/2 METHOD: 2.81 CM2
OHS CV CPX 1 MINUTE RECOVERY HEART RATE: 120 BPM
OHS CV CPX 85 PERCENT MAX PREDICTED HEART RATE MALE: 130
OHS CV CPX ESTIMATED METS: 8
OHS CV CPX MAX PREDICTED HEART RATE: 153
OHS CV CPX PATIENT HEIGHT IN: 70
OHS CV CPX PATIENT IS FEMALE: 0
OHS CV CPX PATIENT IS MALE: 1
OHS CV CPX PEAK DIASTOLIC BLOOD PRESSURE: 62 MMHG
OHS CV CPX PEAK HEAR RATE: 142 BPM
OHS CV CPX PEAK RATE PRESSURE PRODUCT: NORMAL
OHS CV CPX PEAK SYSTOLIC BLOOD PRESSURE: 169 MMHG
OHS CV CPX PERCENT MAX PREDICTED HEART RATE ACHIEVED: 93
OHS CV CPX RATE PRESSURE PRODUCT PRESENTING: 6750
OHS CV RV/LV RATIO: 0.74 CM
PISA AR MAX VEL: 3.59 M/S
PISA TR MAX VEL: 2.2 M/S
PULM VEIN S/D RATIO: 1.36
PV MEAN GRADIENT: 2 MMHG
PV PEAK D VEL: 0.42 M/S
PV PEAK GRADIENT: 6 MMHG
PV PEAK S VEL: 0.57 M/S
PV PEAK VELOCITY: 1.27 M/S
RA MAJOR: 4.1 CM
RA PRESSURE ESTIMATED: 3 MMHG
RA WIDTH: 3.92 CM
RIGHT VENTRICLE DIASTOLIC BASEL DIMENSION: 4.3 CM
RIGHT VENTRICULAR END-DIASTOLIC DIMENSION: 4.32 CM
RV TB RVSP: 5 MMHG
SINUS: 3.6 CM
STJ: 3.4 CM
STRESS ECHO POST EXERCISE DUR MIN: 6 MINUTES
STRESS ECHO POST EXERCISE DUR SEC: 31 SECONDS
SYSTOLIC BLOOD PRESSURE: 135 MMHG
TDI LATERAL: 0.11 M/S
TDI SEPTAL: 0.07 M/S
TDI: 0.09 M/S
TR MAX PG: 20 MMHG
TRICUSPID ANNULAR PLANE SYSTOLIC EXCURSION: 2.6 CM
TV REST PULMONARY ARTERY PRESSURE: 22 MMHG
Z-SCORE OF LEFT VENTRICULAR DIMENSION IN END DIASTOLE: -3.75
Z-SCORE OF LEFT VENTRICULAR DIMENSION IN END SYSTOLE: -3.21

## 2025-08-14 PROCEDURE — 93351 STRESS TTE COMPLETE: CPT
